# Patient Record
(demographics unavailable — no encounter records)

---

## 2025-01-18 NOTE — HISTORY OF PRESENT ILLNESS
[FreeTextEntry1] : NPV- mole [de-identified] : Herman Mercer 30 y/o M presents for mole on L upper cheek. mole on L upper cheek x years Has been getting more raised and a little larger.  No symptoms does not feel very concerned but wants it evaluated to be sure.   Personal history of skin cancer: No  Family history of skin cancer: No  History of blistering sunburns: No  History of tanning bed use: No Uses sunscreen regularly: Yes

## 2025-01-18 NOTE — HISTORY OF PRESENT ILLNESS
[FreeTextEntry1] : NPV- mole [de-identified] : Herman Mercer 30 y/o M presents for mole on L upper cheek. mole on L upper cheek x years Has been getting more raised and a little larger.  No symptoms does not feel very concerned but wants it evaluated to be sure.   Personal history of skin cancer: No  Family history of skin cancer: No  History of blistering sunburns: No  History of tanning bed use: No Uses sunscreen regularly: Yes

## 2025-01-18 NOTE — ASSESSMENT
[FreeTextEntry1] : #Nevus on L upper medial cheek Lesion carefully examined clinically and under dermoscopy.  Pt was reassured that clinical features were benign on exam today. I reviewed pros and cons of taking a today for histological confirmation.  He declines biopsy for now, would prefer to monitor it.  I counselled him on concerning changes to monitor for (ABCDE of melanoma) and signs such as rapid growth, new colors, irregular borders, or spontaneous pain/bleeding/ulcerations. He verbalized understanding and agreement with this plan  RTC prn